# Patient Record
Sex: FEMALE | URBAN - METROPOLITAN AREA
[De-identification: names, ages, dates, MRNs, and addresses within clinical notes are randomized per-mention and may not be internally consistent; named-entity substitution may affect disease eponyms.]

---

## 2022-02-16 ENCOUNTER — APPOINTMENT (RX ONLY)
Dept: URBAN - METROPOLITAN AREA CLINIC 76 | Facility: CLINIC | Age: 37
Setting detail: DERMATOLOGY
End: 2022-02-16

## 2022-02-16 DIAGNOSIS — L30.9 DERMATITIS, UNSPECIFIED: ICD-10-CM

## 2022-02-16 DIAGNOSIS — R21 RASH AND OTHER NONSPECIFIC SKIN ERUPTION: ICD-10-CM

## 2022-02-16 PROCEDURE — 99203 OFFICE O/P NEW LOW 30 MIN: CPT

## 2022-02-16 PROCEDURE — ? TREATMENT REGIMEN

## 2022-02-16 PROCEDURE — ? COUNSELING

## 2022-02-16 PROCEDURE — ? PRESCRIPTION

## 2022-02-16 RX ORDER — TACROLIMUS 1 MG/G
OINTMENT TOPICAL
Qty: 100 | Refills: 1 | Status: ERX | COMMUNITY
Start: 2022-02-16

## 2022-02-16 RX ADMIN — TACROLIMUS: 1 OINTMENT TOPICAL at 00:00

## 2022-02-16 ASSESSMENT — LOCATION ZONE DERM
LOCATION ZONE: TRUNK
LOCATION ZONE: LEG
LOCATION ZONE: ARM

## 2022-02-16 ASSESSMENT — LOCATION SIMPLE DESCRIPTION DERM
LOCATION SIMPLE: RIGHT FOREARM
LOCATION SIMPLE: LEFT FOREARM
LOCATION SIMPLE: RIGHT POSTERIOR THIGH
LOCATION SIMPLE: ABDOMEN
LOCATION SIMPLE: LEFT POSTERIOR THIGH

## 2022-02-16 ASSESSMENT — LOCATION DETAILED DESCRIPTION DERM
LOCATION DETAILED: RIGHT DISTAL POSTERIOR THIGH
LOCATION DETAILED: RIGHT VENTRAL PROXIMAL FOREARM
LOCATION DETAILED: PERIUMBILICAL SKIN
LOCATION DETAILED: LEFT DISTAL POSTERIOR THIGH
LOCATION DETAILED: LEFT VENTRAL DISTAL FOREARM

## 2022-02-16 NOTE — HPI: RASH (ECZEMA)
How Severe Is Your Eczema?: moderate
Is This A New Presentation, Or A Follow-Up?: Rash
Additional History: Patient was referred form her PCP who recently increased the strength of TAC 0.1% to 0.5%

## 2022-02-16 NOTE — PROCEDURE: TREATMENT REGIMEN
Continue Regimen: Unscented soaps and body wash\\nFree and clear detergent and fabric softeners\\nBetamethasone cream to hands bid PRN \\nTAC 0.5% to affected areas bid PRN when flared badly
Plan: Discussed patch testing
Detail Level: Zone
Initiate Treatment: TAC 0.5% cream to affected areas bid for 2-3 days
Initiate Treatment: Tacrolimus 0.1% ointment daily to affected areas

## 2022-03-07 ENCOUNTER — APPOINTMENT (RX ONLY)
Dept: URBAN - METROPOLITAN AREA CLINIC 76 | Facility: CLINIC | Age: 37
Setting detail: DERMATOLOGY
End: 2022-03-07

## 2022-03-07 DIAGNOSIS — L23.9 ALLERGIC CONTACT DERMATITIS, UNSPECIFIED CAUSE: ICD-10-CM

## 2022-03-07 PROCEDURE — ? PATCH TESTING

## 2022-03-07 PROCEDURE — 95044 PATCH/APPLICATION TESTS: CPT

## 2022-03-07 NOTE — PROCEDURE: PATCH TESTING
Number Of Patches (Maximum Allowable Per Dos By Cms Is 90): 80
Consent: Verbal consent obtained, risks reviewed including but not limited to rash, itching, allergic reaction, systemic rash, remote possibility of anaphylaxis to allergen.
Detail Level: None
Post-Care Instructions: I reviewed with the patient in detail post-care instructions. Patient should not sweat, pick at, or get the patches wet for 48 hours.

## 2022-03-09 ENCOUNTER — APPOINTMENT (RX ONLY)
Dept: URBAN - METROPOLITAN AREA CLINIC 76 | Facility: CLINIC | Age: 37
Setting detail: DERMATOLOGY
End: 2022-03-09

## 2022-03-09 DIAGNOSIS — L23.9 ALLERGIC CONTACT DERMATITIS, UNSPECIFIED CAUSE: ICD-10-CM

## 2022-03-09 PROCEDURE — ? NORTH AMERICAN 80 PATCH TEST READING

## 2022-03-09 NOTE — PROCEDURE: NORTH AMERICAN 80 PATCH TEST READING
Allergen 13 Reaction: no reaction
Name Of Allergen 56: DMDM Hydantoin
Allergen 74 Reaction: 1+
Name Of Allergen 35: Chloroxylenol (PCMX) / (4?Chloro?3,5?xylenol (PCMX))
Name Of Allergen 77: Formaldehyde
Name Of Allergen 66: Compositae Mix II
Allergen 19 Reaction: +/-
Name Of Allergen 4: P?Phenylenediamine (PPD
Name Of Allergen 9: Neomycin sulfate
Name Of Allergen 60: Hydroperoxides of Limonene
Name Of Allergen 45: B?033A Budesonide
Name Of Allergen 7: Amerchol L 101
Name Of Allergen 62: Polysorbate 80 / (Polyoxyethylenesorbitanmonooleate(Tween 80))
Name Of Allergen 5: Imidazolidinyl urea Germall 115)
Name Of Allergen 28: Fragrance mix
Name Of Allergen 1: Benzocaine
Name Of Allergen 24: Mixed dialkyl thiourea
Name Of Allergen 55: HEMA (2?Hydroxyethyl methacrylate)
Name Of Allergen 30: 2?Bromo?2?nitropropane?l,3?diol (Bronopol)
Name Of Allergen 12: Ethylenediamine dihydrochloride
Name Of Allergen 41: Toluenesulfonamide formaldehyde resin
Name Of Allergen 3: Colophonium / (Colophony)
Name Of Allergen 29: Glutaral / (Glutaraldehyde)
Name Of Allergen 42: Methyl methacrylate
Name Of Allergen 46: Cocamide IGNACIO / (Coconut diethanolamide)
Name Of Allergen 14: Quaternium?15 (Dowicil 200) / (1?(3?Chloroallyl)?3,5,7?triaza?1azoniaadamantane chloride)
Name Of Allergen 44: Tixocortol?21?pivalate
Name Of Allergen 52: Benzyl salicylate
Number Of Patches Read: 80
Name Of Allergen 78: Methylisothiazolinone + Methylchloroisothiazolinone / (Cl+?Meisothiazolinone (Peggy CG 200ppm))
Name Of Allergen 53: Decyl Glucoside
Name Of Allergen 11: Clobetasol?17?propionate
Name Of Allergen 6: Cinnamal / (Cinnamic aldehyde)
Name Of Allergen 15: 4?tert?Butylphenolformaldehyde resin
Show Allergen Counseling In The Note?: No
Name Of Allergen 40: Glyceryl monothioglycolate (GMTG)
Name Of Allergen 47: Triethanolamine
Name Of Allergen 16: Mercapto mix
Name Of Allergen 48: Textile dye mix
Name Of Allergen 32: Thimerosal (Merthiolate)
Name Of Allergen 8: Carba mix
Name Of Allergen 75: Amidoamine
Name Of Allergen 36: Ethyleneurea, melamine formaldehyde mix
Name Of Allergen 2: 2?Mercaptobenzothiazole (MBT)
Name Of Allergen 25: Disperse Orange 3
Name Of Allergen 67: Lidocaine
Name Of Allergen 33: Propolis
Name Of Allergen 58: Benzyl alcohol
Name Of Allergen 21: Diazolidinylurea (Germall II)
Name Of Allergen 80: Oleamidopropyl Dimethylamine
Name Of Allergen 13: Epoxy resin Bisphenol A
Name Of Allergen 69: Dibucaine hydrochloride
Name Of Allergen 27: Methyldibromo glutaronitrile (MDBGN)
Name Of Allergen 19: Myroxylon pereirae resin / (Balsam [place])
Name Of Allergen 20: Nickelsulfate hexahydrate
Name Of Allergen 74: Hydroperoxides of Linalool
Name Of Allergen 63: Iodopropynyl butyl carbamate
Name Of Allergen 70: Benzoyl Peroxide
Name Of Allergen 59: Isopropyl myristate
Name Of Allergen 50: Fragrance Mix II
Name Of Allergen 18: Potassium dichromate
Name Of Allergen 26: Paraben Mix
Show Negative Results In The Note?: Yes
Detail Level: Zone
Name Of Allergen 73: Ethylhexyl Salicylate
Name Of Allergen 22: Tocopherol/ (DL alpha tocopherol)
Name Of Allergen 68: Fusidic acid sodium salt
Name Of Allergen 10: Thiuram mix
Name Of Allergen 37: 2?tert?Butyl?4?methoxyphenol (BHA)
Name Of Allergen 51: Disperse Yellow 3
Name Of Allergen 61: Desoximetasone
Name Of Allergen 76: Cocamidopropylbetaine
What Reading Time Point?: 48 hour
Name Of Allergen 71: Isoamyl?p?methoxycinnamate
Name Of Allergen 64: 2?n?Octyl?4?isothiazolin?3?one
Name Of Allergen 72: Hydroxyisohexyl 3?CyclohexeneCarboxaldehyde (Lyral)
Name Of Allergen 34: Benzophenone?3 / (2?Hydroxy?4?methoxybenzophenone)
Name Of Allergen 17: N,N?Diphenylguanidine
Name Of Allergen 38: Gold(I)sodium thiosulfate dihydrate
Name Of Allergen 39: Ethyl acrylate
Name Of Allergen 31: Sesquiterpene lactone mix
Name Of Allergen 49: Tea Tree Oil
Name Of Allergen 43: Cobalt(II) chloride hexahydrate
Name Of Allergen 65: Disperse Blue 106/124 Mix
Name Of Allergen 79: Propylene glycol
Name Of Allergen 57: Cananga odorata oil / (Ylang?Ylang oil)
Name Of Allergen 54: METHYLISOTHIAZOLINONE
Name Of Allergen 23: Bacitracin

## 2022-03-11 ENCOUNTER — APPOINTMENT (RX ONLY)
Dept: URBAN - METROPOLITAN AREA CLINIC 76 | Facility: CLINIC | Age: 37
Setting detail: DERMATOLOGY
End: 2022-03-11

## 2022-03-11 DIAGNOSIS — L23.9 ALLERGIC CONTACT DERMATITIS, UNSPECIFIED CAUSE: ICD-10-CM

## 2022-03-11 PROCEDURE — ? COUNSELING

## 2022-03-11 PROCEDURE — 99213 OFFICE O/P EST LOW 20 MIN: CPT

## 2022-03-11 PROCEDURE — ? NORTH AMERICAN 80 PATCH TEST READING

## 2022-03-11 NOTE — PROCEDURE: [place]TH AMERICAN 80 PATCH TEST READING
Allergen 13 Reaction: no reaction
Name Of Allergen 56: DMDM Hydantoin
Allergen 74 Reaction: 1+
Name Of Allergen 35: Chloroxylenol (PCMX) / (4?Chloro?3,5?xylenol (PCMX))
Name Of Allergen 77: Formaldehyde
Name Of Allergen 66: Compositae Mix II
Name Of Allergen 4: P?Phenylenediamine (PPD
Name Of Allergen 9: Neomycin sulfate
Name Of Allergen 60: Hydroperoxides of Limonene
Name Of Allergen 45: B?033A Budesonide
Name Of Allergen 7: Amerchol L 101
Name Of Allergen 62: Polysorbate 80 / (Polyoxyethylenesorbitanmonooleate(Tween 80))
Name Of Allergen 5: Imidazolidinyl urea Germall 115)
Name Of Allergen 28: Fragrance mix
Name Of Allergen 1: Benzocaine
Name Of Allergen 24: Mixed dialkyl thiourea
Name Of Allergen 55: HEMA (2?Hydroxyethyl methacrylate)
Name Of Allergen 30: 2?Bromo?2?nitropropane?l,3?diol (Bronopol)
Name Of Allergen 12: Ethylenediamine dihydrochloride
Name Of Allergen 41: Toluenesulfonamide formaldehyde resin
Name Of Allergen 3: Colophonium / (Colophony)
Name Of Allergen 29: Glutaral / (Glutaraldehyde)
Name Of Allergen 42: Methyl methacrylate
Name Of Allergen 46: Cocamide IGNACIO / (Coconut diethanolamide)
Name Of Allergen 14: Quaternium?15 (Dowicil 200) / (1?(3?Chloroallyl)?3,5,7?triaza?1azoniaadamantane chloride)
Name Of Allergen 44: Tixocortol?21?pivalate
Name Of Allergen 52: Benzyl salicylate
Number Of Patches Read: 80
Name Of Allergen 78: Methylisothiazolinone + Methylchloroisothiazolinone / (Cl+?Meisothiazolinone (Peggy CG 200ppm))
Name Of Allergen 53: Decyl Glucoside
Name Of Allergen 11: Clobetasol?17?propionate
Name Of Allergen 6: Cinnamal / (Cinnamic aldehyde)
Name Of Allergen 15: 4?tert?Butylphenolformaldehyde resin
Show Allergen Counseling In The Note?: No
Name Of Allergen 40: Glyceryl monothioglycolate (GMTG)
Name Of Allergen 47: Triethanolamine
Allergen 48 Reaction: +/-
Name Of Allergen 16: Mercapto mix
Name Of Allergen 48: Textile dye mix
Name Of Allergen 32: Thimerosal (Merthiolate)
Name Of Allergen 8: Carba mix
Name Of Allergen 75: Amidoamine
Name Of Allergen 36: Ethyleneurea, melamine formaldehyde mix
Name Of Allergen 2: 2?Mercaptobenzothiazole (MBT)
Name Of Allergen 25: Disperse Orange 3
Name Of Allergen 67: Lidocaine
Name Of Allergen 33: Propolis
Name Of Allergen 58: Benzyl alcohol
Name Of Allergen 21: Diazolidinylurea (Germall II)
Name Of Allergen 80: Oleamidopropyl Dimethylamine
Name Of Allergen 13: Epoxy resin Bisphenol A
Name Of Allergen 69: Dibucaine hydrochloride
Name Of Allergen 27: Methyldibromo glutaronitrile (MDBGN)
Name Of Allergen 19: Myroxylon pereirae resin / (Balsam [place])
Name Of Allergen 20: Nickelsulfate hexahydrate
Name Of Allergen 74: Hydroperoxides of Linalool
Name Of Allergen 63: Iodopropynyl butyl carbamate
Name Of Allergen 70: Benzoyl Peroxide
Name Of Allergen 59: Isopropyl myristate
Name Of Allergen 50: Fragrance Mix II
Name Of Allergen 18: Potassium dichromate
Name Of Allergen 26: Paraben Mix
Show Negative Results In The Note?: Yes
Detail Level: Zone
Name Of Allergen 73: Ethylhexyl Salicylate
Name Of Allergen 22: Tocopherol/ (DL alpha tocopherol)
Name Of Allergen 68: Fusidic acid sodium salt
Name Of Allergen 10: Thiuram mix
Name Of Allergen 37: 2?tert?Butyl?4?methoxyphenol (BHA)
Name Of Allergen 51: Disperse Yellow 3
Name Of Allergen 61: Desoximetasone
Name Of Allergen 76: Cocamidopropylbetaine
What Reading Time Point?: 48 hour
Name Of Allergen 71: Isoamyl?p?methoxycinnamate
Name Of Allergen 64: 2?n?Octyl?4?isothiazolin?3?one
Name Of Allergen 72: Hydroxyisohexyl 3?CyclohexeneCarboxaldehyde (Lyral)
Name Of Allergen 34: Benzophenone?3 / (2?Hydroxy?4?methoxybenzophenone)
Name Of Allergen 17: N,N?Diphenylguanidine
Name Of Allergen 38: Gold(I)sodium thiosulfate dihydrate
Name Of Allergen 39: Ethyl acrylate
Name Of Allergen 31: Sesquiterpene lactone mix
Name Of Allergen 49: Tea Tree Oil
Name Of Allergen 43: Cobalt(II) chloride hexahydrate
Name Of Allergen 65: Disperse Blue 106/124 Mix
Name Of Allergen 79: Propylene glycol
Name Of Allergen 57: Cananga odorata oil / (Ylang?Ylang oil)
Name Of Allergen 54: METHYLISOTHIAZOLINONE
Name Of Allergen 23: Bacitracin

## 2023-12-14 ENCOUNTER — APPOINTMENT (RX ONLY)
Dept: URBAN - METROPOLITAN AREA CLINIC 76 | Facility: CLINIC | Age: 38
Setting detail: DERMATOLOGY
End: 2023-12-14

## 2023-12-14 DIAGNOSIS — D49.2 NEOPLASM OF UNSPECIFIED BEHAVIOR OF BONE, SOFT TISSUE, AND SKIN: ICD-10-CM

## 2023-12-14 DIAGNOSIS — L82.1 OTHER SEBORRHEIC KERATOSIS: ICD-10-CM

## 2023-12-14 DIAGNOSIS — D22 MELANOCYTIC NEVI: ICD-10-CM

## 2023-12-14 DIAGNOSIS — D18.0 HEMANGIOMA: ICD-10-CM

## 2023-12-14 DIAGNOSIS — L81.4 OTHER MELANIN HYPERPIGMENTATION: ICD-10-CM

## 2023-12-14 DIAGNOSIS — Z71.89 OTHER SPECIFIED COUNSELING: ICD-10-CM

## 2023-12-14 PROBLEM — D22.5 MELANOCYTIC NEVI OF TRUNK: Status: ACTIVE | Noted: 2023-12-14

## 2023-12-14 PROBLEM — D18.01 HEMANGIOMA OF SKIN AND SUBCUTANEOUS TISSUE: Status: ACTIVE | Noted: 2023-12-14

## 2023-12-14 PROCEDURE — ? BIOPSY BY SHAVE METHOD

## 2023-12-14 PROCEDURE — ? COUNSELING

## 2023-12-14 PROCEDURE — 11102 TANGNTL BX SKIN SINGLE LES: CPT

## 2023-12-14 PROCEDURE — 99213 OFFICE O/P EST LOW 20 MIN: CPT | Mod: 25

## 2023-12-14 ASSESSMENT — LOCATION SIMPLE DESCRIPTION DERM
LOCATION SIMPLE: LEFT UPPER BACK
LOCATION SIMPLE: RIGHT KNEE

## 2023-12-14 ASSESSMENT — LOCATION DETAILED DESCRIPTION DERM
LOCATION DETAILED: LEFT SUPERIOR UPPER BACK
LOCATION DETAILED: RIGHT KNEE

## 2023-12-14 ASSESSMENT — LOCATION ZONE DERM
LOCATION ZONE: TRUNK
LOCATION ZONE: LEG

## 2023-12-14 NOTE — HPI: EVALUATION OF SKIN LESION(S)
What Type Of Note Output Would You Prefer (Optional)?: Standard Output
Hpi Title: Evaluation of Skin Lesions
How Severe Are Your Spot(S)?: mild
Have Your Spot(S) Been Treated In The Past?: has not been treated
Family Member: Sister
Additional History: Spot on right knee of concern.

## 2023-12-14 NOTE — PROCEDURE: BIOPSY BY SHAVE METHOD
Body Location Override (Optional - Billing Will Still Be Based On Selected Body Map Location If Applicable): right medial knee 3x3mm
Detail Level: Detailed
Depth Of Biopsy: dermis
Was A Bandage Applied: Yes
Size Of Lesion In Cm: 0
Biopsy Type: H and E
Biopsy Method: 15 blade
Anesthesia Type: normal saline
Anesthesia Volume In Cc: 0.5
Hemostasis: Drysol
Wound Care: Petrolatum
Dressing: bandage
Destruction After The Procedure: No
Type Of Destruction Used: Curettage
Curettage Text: The wound bed was treated with curettage after the biopsy was performed.
Cryotherapy Text: The wound bed was treated with cryotherapy after the biopsy was performed.
Electrodesiccation Text: The wound bed was treated with electrodesiccation after the biopsy was performed.
Electrodesiccation And Curettage Text: The wound bed was treated with electrodesiccation and curettage after the biopsy was performed.
Silver Nitrate Text: The wound bed was treated with silver nitrate after the biopsy was performed.
Lab: [id number]
Lab Facility: [id number]
Consent: Written consent was obtained and risks were reviewed including but not limited to scarring, infection, bleeding, scabbing, incomplete removal, nerve damage and allergy to anesthesia.
Post-Care Instructions: I reviewed with the patient in detail post-care instructions. Patient is to keep the biopsy site dry overnight, and then apply bacitracin twice daily until healed. Patient may apply hydrogen peroxide soaks to remove any crusting.
Notification Instructions: Patient will be notified of biopsy results. However, patient instructed to call the office if not contacted within 2 weeks.
Billing Type: Third-Party Bill
Information: Selecting Yes will display possible errors in your note based on the variables you have selected. This validation is only offered as a suggestion for you. PLEASE NOTE THAT THE VALIDATION TEXT WILL BE REMOVED WHEN YOU FINALIZE YOUR NOTE. IF YOU WANT TO FAX A PRELIMINARY NOTE YOU WILL NEED TO TOGGLE THIS TO 'NO' IF YOU DO NOT WANT IT IN YOUR FAXED NOTE.

## 2024-10-21 ENCOUNTER — APPOINTMENT (RX ONLY)
Dept: URBAN - METROPOLITAN AREA CLINIC 76 | Facility: CLINIC | Age: 39
Setting detail: DERMATOLOGY
End: 2024-10-21

## 2024-10-21 DIAGNOSIS — L91.8 OTHER HYPERTROPHIC DISORDERS OF THE SKIN: ICD-10-CM

## 2024-10-21 PROCEDURE — ? SKIN TAG REMOVAL

## 2024-10-21 PROCEDURE — ? COUNSELING

## 2024-10-21 PROCEDURE — 11200 RMVL SKIN TAGS UP TO&INC 15: CPT

## 2024-10-21 ASSESSMENT — LOCATION SIMPLE DESCRIPTION DERM
LOCATION SIMPLE: LEFT AXILLARY VAULT
LOCATION SIMPLE: LEFT POSTERIOR AXILLA

## 2024-10-21 ASSESSMENT — LOCATION DETAILED DESCRIPTION DERM
LOCATION DETAILED: LEFT AXILLARY VAULT
LOCATION DETAILED: LEFT POSTERIOR AXILLA

## 2024-10-21 ASSESSMENT — LOCATION ZONE DERM: LOCATION ZONE: AXILLAE

## 2024-10-21 NOTE — PROCEDURE: SKIN TAG REMOVAL
Anesthesia Type: 1% lidocaine without epinephrine
Medical Necessity Information: It is in your best interest to select a reason for this procedure from the list below. All of these items fulfill various CMS LCD requirements except the new and changing color options.
Medical Necessity Clause: This procedure was medically necessary because the lesions that were treated were:
Include Z78.9 (Other Specified Conditions Influencing Health Status) As An Associated Diagnosis?: No
Detail Level: Detailed
Add Associated Diagnoses If Applicable When Selecting Medical Necessity: Yes
Consent: Written consent obtained and the risks of skin tag removal was reviewed with the patient including but not limited to bleeding, pigmentary change, infection, pain, and remote possibility of scarring.